# Patient Record
Sex: MALE | Race: OTHER | NOT HISPANIC OR LATINO | Employment: FULL TIME | URBAN - METROPOLITAN AREA
[De-identification: names, ages, dates, MRNs, and addresses within clinical notes are randomized per-mention and may not be internally consistent; named-entity substitution may affect disease eponyms.]

---

## 2024-06-10 ENCOUNTER — OFFICE VISIT (OUTPATIENT)
Dept: URGENT CARE | Facility: PHYSICIAN GROUP | Age: 54
End: 2024-06-10
Payer: COMMERCIAL

## 2024-06-10 VITALS
WEIGHT: 230 LBS | BODY MASS INDEX: 32.93 KG/M2 | HEIGHT: 70 IN | OXYGEN SATURATION: 98 % | SYSTOLIC BLOOD PRESSURE: 120 MMHG | DIASTOLIC BLOOD PRESSURE: 68 MMHG | RESPIRATION RATE: 16 BRPM | TEMPERATURE: 97.7 F | HEART RATE: 77 BPM

## 2024-06-10 DIAGNOSIS — A08.4 VIRAL GASTROENTERITIS: ICD-10-CM

## 2024-06-10 PROCEDURE — 99212 OFFICE O/P EST SF 10 MIN: CPT

## 2024-06-10 PROCEDURE — 3074F SYST BP LT 130 MM HG: CPT

## 2024-06-10 PROCEDURE — 3078F DIAST BP <80 MM HG: CPT

## 2024-06-10 ASSESSMENT — ENCOUNTER SYMPTOMS
FEVER: 0
COUGH: 0
WHEEZING: 0
EYE PAIN: 0
SORE THROAT: 0
PALPITATIONS: 0
DIZZINESS: 0
SPUTUM PRODUCTION: 0
EYE REDNESS: 0
SHORTNESS OF BREATH: 0
MYALGIAS: 0
EYE DISCHARGE: 0
STRIDOR: 0

## 2024-06-11 NOTE — PROGRESS NOTES
Subjective:   Nilay Multani is a 53 y.o. male who presents for headache, nausea, vomiting, diarrhea, chills        I introduced myself to the patient and informed them that I am a Family Nurse Practitioner.    HPI:Nilay is a 53 year-old male who comes in today c/o headache, nausea, vomiting, chills, diarrhea, abdominal cramping.  Onset was 3 days ago.  He describes having one brief episode of blurred vision and dizziness 3 days ago right when he began getting sick, has not had any similar episodes since. States he feels fine now, but wanted to come into urgent care today to get checked out as he is about to leave on vacation. Patient describes symptoms as transient. They describe the headache pain as bilateral, vicelike, mild abdominal cramping. Aggravating factors include none. Relieving factors include none. Treatments tried at home include  drank a lot of fluid and some ORS and felt better . They describe their symptoms as moderate, but gone now.  He denies any recent travel out of the [facility] or over the country, recent camping or hiking trips, drinking from lakes or streams or other questionable water sources.  Cannot identify that he ate anything unusual before his symptoms began.      Review of Systems   Constitutional:  Positive for chills and malaise/fatigue. Negative for fever.   HENT:  Negative for congestion, ear pain and sore throat.    Eyes:  Negative for pain, discharge and redness.   Respiratory:  Negative for cough, sputum production, shortness of breath, wheezing and stridor.    Cardiovascular:  Negative for chest pain and palpitations.   Gastrointestinal:  Positive for abdominal pain, diarrhea, nausea and vomiting. Negative for blood in stool, constipation and melena.   Genitourinary:  Negative for dysuria, flank pain, frequency, hematuria and urgency.   Musculoskeletal:  Negative for myalgias.   Skin:  Negative for rash.   Neurological:  Positive for headaches. Negative for dizziness.  "      Medications: This patient does not have an active medication from one of the medication groupers.     Allergies: Patient has no known allergies.    Problem List: does not have a problem list on file.    Surgical History:  No past surgical history on file.    Past Social Hx:   reports that he has never smoked. He has never used smokeless tobacco. He reports current alcohol use. He reports that he does not use drugs.     Past Family Hx:   family history is not on file.     Problem list, medications, and allergies reviewed by myself today in Epic.   I have documented what I find to be significant in regards to past medical, social, family and surgical history  in my HPI or under PMH/PSH/FH review section, otherwise it is noncontributory     Objective:     /68   Pulse 77   Temp 36.5 °C (97.7 °F) (Temporal)   Resp 16   Ht 1.778 m (5' 10\")   Wt 104 kg (230 lb)   SpO2 98%   BMI 33.00 kg/m²     During this visit, appropriate PPE was worn, and hand hygiene was performed.    Physical Exam  Vitals reviewed.   Constitutional:       General: He is not in acute distress.     Appearance: Normal appearance. He is not ill-appearing or toxic-appearing.   HENT:      Head: Normocephalic and atraumatic.      Right Ear: Tympanic membrane, ear canal and external ear normal. There is no impacted cerumen.      Left Ear: Tympanic membrane, ear canal and external ear normal. There is no impacted cerumen.      Nose: No congestion or rhinorrhea.      Mouth/Throat:      Pharynx: Oropharynx is clear. No oropharyngeal exudate or posterior oropharyngeal erythema.   Eyes:      General: No scleral icterus.        Right eye: No discharge.         Left eye: No discharge.      Conjunctiva/sclera: Conjunctivae normal.      Pupils: Pupils are equal, round, and reactive to light.   Cardiovascular:      Rate and Rhythm: Normal rate and regular rhythm.      Heart sounds: Normal heart sounds. No murmur heard.     No friction rub. No gallop. "   Pulmonary:      Effort: Pulmonary effort is normal. No respiratory distress.      Breath sounds: Normal breath sounds. No wheezing, rhonchi or rales.   Abdominal:      General: Bowel sounds are normal. There is no distension or abdominal bruit.      Palpations: There is no hepatomegaly, splenomegaly or mass.      Tenderness: There is no abdominal tenderness. There is no right CVA tenderness, left CVA tenderness, guarding or rebound. Negative signs include Garcia's sign, Rovsing's sign and McBurney's sign.      Hernia: No hernia is present.   Musculoskeletal:         General: Normal range of motion.      Cervical back: Normal range of motion. No rigidity.      Right lower leg: No edema.      Left lower leg: No edema.   Lymphadenopathy:      Cervical: No cervical adenopathy.   Skin:     General: Skin is warm and dry.      Coloration: Skin is not jaundiced.   Neurological:      General: No focal deficit present.      Mental Status: He is alert and oriented to person, place, and time. Mental status is at baseline.   Psychiatric:         Mood and Affect: Mood normal.         Behavior: Behavior normal.         Thought Content: Thought content normal.         Judgment: Judgment normal.         Assessment/Plan:     Diagnosis and associated orders:     1. Viral gastroenteritis           Comments/MDM:     1. Viral gastroenteritis  I discussed the following with patient -   Abdominal exam is negative for any concerning findings, vital signs are stable in clinic today and they are afebrile,  managing fluids appropriately and staying hydrated, symptoms are/were likely due to a virus causing gastroenteritis.  We discussed the following supportive care measures -     Maintain hydration by pushing fluids, at least 8oz every hour, avoid tea or coffee, avoid any alcoholic beverages, use an oral rehydration solution such as pedialyte. Monitor for signs of dehydration such as dry mucous membranes, poor skin turgor. Strict ER  precautions given if patient is not able to maintain hydration.  Diet ?I instructed patient that dietary modification may be helpful. For patients with watery diarrhea, encourage initially BRAT diet until diarrhea is subsiding, then start consumption of boiled starches and cereals (eg, potatoes, noodles, rice, wheat, and oat) with salt; crackers, bananas, soup, and boiled vegetables are also reasonable options. Avoid foods with high fat content.  Temporary avoidance of many lactose-containing foods may be helpful; dairy products (except yogurt) may be difficult to digest due to secondary lactose malabsorption, which may last for several weeks to months.   -Probiotics - Use of probiotics in adults with acute infectious gastroenteritis is unproven.  Avoid use of antidiarrheal medication such as imodium.  We discussed viral versus bacterial infection, and I informed patient that I believe they have a viral infection at this time and antibiotics are not an effective treatment for this.   Patient was instructed that viral gastroenteritis is self-limiting and they should feel better in 7-10 days but to return to clinic if symptoms do not improve or worsen after 10 days and we will consider getting stool cultures.   Strict ER precautions were given if they get decreased urine output, fever and/or escalating abdominal and/or flank pain, lethargy, or altered mental status.   I did print written instructions for patient, and did go over the diagnosis including differentials,  and answered their questions to the best of my ability. Patient states they have good understanding of instructions, and are agreeable with the plan of care.            Pt is clinically stable at today's acute urgent care visit. Vital signs are normal and reassuring.  No acute distress noted. Appropriate for outpatient management at this time.        I personally reviewed prior external notes and test results pertinent to today's visit.  I have  independently reviewed and interpreted all diagnostics ordered during this urgent care acute visit.        Please note that this dictation was created using voice recognition software. I have made a reasonable attempt to correct obvious errors, but I expect that there are errors of grammar and possibly content that I did not discover before finalizing the note.    This note was electronically signed by Scooter SIMMS, JOSE ALBERTO, YAIMA, JEREMÍAS

## 2024-06-14 ASSESSMENT — ENCOUNTER SYMPTOMS
VOMITING: 1
FLANK PAIN: 0
NAUSEA: 1
HEADACHES: 1
ABDOMINAL PAIN: 1
CHILLS: 1
CONSTIPATION: 0
BLOOD IN STOOL: 0
DIARRHEA: 1

## 2025-02-20 ENCOUNTER — OFFICE VISIT (OUTPATIENT)
Dept: URGENT CARE | Facility: CLINIC | Age: 55
End: 2025-02-20
Payer: COMMERCIAL

## 2025-02-20 VITALS
DIASTOLIC BLOOD PRESSURE: 92 MMHG | HEART RATE: 86 BPM | WEIGHT: 234.79 LBS | SYSTOLIC BLOOD PRESSURE: 134 MMHG | BODY MASS INDEX: 33.61 KG/M2 | HEIGHT: 70 IN | OXYGEN SATURATION: 95 % | TEMPERATURE: 98.5 F | RESPIRATION RATE: 20 BRPM

## 2025-02-20 DIAGNOSIS — J98.01 BRONCHOSPASM: ICD-10-CM

## 2025-02-20 DIAGNOSIS — J06.9 PROTRACTED URI: ICD-10-CM

## 2025-02-20 PROCEDURE — 3080F DIAST BP >= 90 MM HG: CPT | Performed by: PHYSICIAN ASSISTANT

## 2025-02-20 PROCEDURE — 3075F SYST BP GE 130 - 139MM HG: CPT | Performed by: PHYSICIAN ASSISTANT

## 2025-02-20 PROCEDURE — 1125F AMNT PAIN NOTED PAIN PRSNT: CPT | Performed by: PHYSICIAN ASSISTANT

## 2025-02-20 PROCEDURE — 99214 OFFICE O/P EST MOD 30 MIN: CPT | Performed by: PHYSICIAN ASSISTANT

## 2025-02-20 RX ORDER — BENZONATATE 200 MG/1
200 CAPSULE ORAL 3 TIMES DAILY PRN
Qty: 60 CAPSULE | Refills: 0 | Status: SHIPPED | OUTPATIENT
Start: 2025-02-20

## 2025-02-20 RX ORDER — METHYLPREDNISOLONE 4 MG/1
TABLET ORAL
Qty: 21 TABLET | Refills: 0 | Status: SHIPPED | OUTPATIENT
Start: 2025-02-20

## 2025-02-20 ASSESSMENT — ENCOUNTER SYMPTOMS
WHEEZING: 0
FEVER: 0
NAUSEA: 0
ABDOMINAL PAIN: 0
CHILLS: 0
VOMITING: 0
SORE THROAT: 0
SHORTNESS OF BREATH: 0
PALPITATIONS: 0
SPUTUM PRODUCTION: 0
COUGH: 1
DIARRHEA: 0

## 2025-02-20 ASSESSMENT — PAIN SCALES - GENERAL: PAINLEVEL_OUTOF10: 4=SLIGHT-MODERATE PAIN

## 2025-02-20 NOTE — PROGRESS NOTES
Subjective:   Nilay Multani  is a 54 y.o. male who presents for Cough (Per patient: 1- month, SOB with exertion/winded, raspy voice) and Shoulder Pain (Per patient:1-week, left shoulder, Pinched nerve/feels unusual)      Cough  This is a new problem. The current episode started more than 1 month ago. Pertinent negatives include no chest pain, chills, ear pain, fever, rash, sore throat, shortness of breath or wheezing.   Shoulder Pain  Associated symptoms include coughing. Pertinent negatives include no abdominal pain, chest pain, chills, fever, nausea, rash, sore throat or vomiting.   Patient presents urgent care describing over a month of symptoms.  At onset he noted symptoms of fever chills and cough.  He has had improvement of fevers chills and no symptoms associated with illness.  He notes continued periodic spastic cough.  He states he stopped working out a month ago when he became ill and has not resumed but as he exerts himself such as going up a flight of stairs he notes slight tightness in his lungs and some spastic coughing.  He denies wheezing.  Denies a history of asthma or need for inhalers with respiratory illnesses in the past.  He denies exertional substernal chest pain.  He describes some tightness to left trapezius that began after massage and has had some exacerbation of that with spastic coughing.  He did use a number of multisymptom cough and congestion medications over the course of respiratory illness but currently is using vitamin supplements herbal tea and cough drops.  He denies any history of knowledge of cardiac risk factors.  He states he was told his blood pressure was slightly elevated 25 years ago and lost some weight improving his BP.  He denies a known history of elevated cholesterol.  He denies having a primary care.  Provider currently.  He is found to be mildly hypertensive in clinic today.    Review of Systems   Constitutional:  Negative for chills and fever.   HENT:   "Negative for ear pain and sore throat.    Respiratory:  Positive for cough. Negative for sputum production, shortness of breath and wheezing.    Cardiovascular:  Negative for chest pain, palpitations and leg swelling.   Gastrointestinal:  Negative for abdominal pain, diarrhea, nausea and vomiting.   Musculoskeletal:         Mild discomfort to left trapezius   Skin:  Negative for rash.       No Known Allergies     Objective:   BP (!) 134/92 (BP Location: Right arm, Patient Position: Sitting, BP Cuff Size: Adult)   Pulse 86   Temp 36.9 °C (98.5 °F) (Temporal)   Resp 20   Ht 1.765 m (5' 9.5\")   Wt 107 kg (234 lb 12.6 oz)   SpO2 95%   BMI 34.18 kg/m²     Physical Exam  Vitals and nursing note reviewed.   Constitutional:       General: He is not in acute distress.     Appearance: Normal appearance. He is well-developed. He is not diaphoretic.   HENT:      Head: Normocephalic and atraumatic.      Right Ear: Tympanic membrane, ear canal and external ear normal.      Left Ear: Tympanic membrane, ear canal and external ear normal.      Nose: Nose normal.      Mouth/Throat:      Mouth: Mucous membranes are moist.      Pharynx: Uvula midline. Posterior oropharyngeal erythema ( mild PND) present. No oropharyngeal exudate.      Tonsils: No tonsillar abscesses.   Eyes:      General: No scleral icterus.        Right eye: No discharge.         Left eye: No discharge.      Conjunctiva/sclera: Conjunctivae normal.   Cardiovascular:      Rate and Rhythm: Normal rate and regular rhythm.      Pulses: Normal pulses.      Heart sounds: Normal heart sounds.   Pulmonary:      Effort: Pulmonary effort is normal. No respiratory distress.      Breath sounds: Normal breath sounds. No stridor. No decreased breath sounds, wheezing, rhonchi or rales.      Comments: Few spastic cough through history and exam  Musculoskeletal:         General: Normal range of motion.      Cervical back: Neck supple.   Skin:     General: Skin is warm and dry. "      Coloration: Skin is not pale.   Neurological:      Mental Status: He is alert and oriented to person, place, and time.      Coordination: Coordination normal.     Discussed and did offer patient an EKG and chest x-ray.  I reviewed with patient normal oxygen saturation and normal lung sounds to auscultation low degree of suspicion for chest x-ray findings.  Discourage unnecessary radiation.  Similarly with no active chest pain and no exertional substernal chest pain patient's encouraged to follow-up with cardiac workup through primary care and referral was placed today.    Assessment/Plan:   1. Protracted URI  - methylPREDNISolone (MEDROL DOSEPAK) 4 MG Tablet Therapy Pack; Follow schedule on package instructions.  Dispense: 21 Tablet; Refill: 0  - benzonatate (TESSALON) 200 MG capsule; Take 1 Capsule by mouth 3 times a day as needed for Cough.  Dispense: 60 Capsule; Refill: 0  - Referral to establish with PCP    2. Bronchospasm  - methylPREDNISolone (MEDROL DOSEPAK) 4 MG Tablet Therapy Pack; Follow schedule on package instructions.  Dispense: 21 Tablet; Refill: 0    Other orders  - ASPIRIN ADULT PO; Take  by mouth.  - Multiple Vitamin (MULTI-VITAMIN DAILY PO); Take  by mouth.  Supportive care is reviewed with patient/caregiver - recommend to push PO fluids and electrolytes, Cautioned regarding potential side effects of steroid, avoid nsaids while using  Sent with Medrol Dosepak both for bronchodilation and muscle spasm/discomfort.  Sent with cough suppressant.  Referral placed for primary care follow-up due to findings of mild hypertension and patient concern for overall health.    I have worn an N95 mask, gloves and eye protection for the entire encounter with this patient.     Differential diagnosis, natural history, supportive care, and indications for immediate follow-up discussed.    My total time spent caring for the patient on the day of the encounter was 31 minutes.   This does not include time spent on  separately billable procedures/tests.

## 2025-03-11 ENCOUNTER — TELEPHONE (OUTPATIENT)
Dept: SCHEDULING | Facility: IMAGING CENTER | Age: 55
End: 2025-03-11
Payer: COMMERCIAL